# Patient Record
Sex: MALE | Race: WHITE | Employment: FULL TIME | URBAN - METROPOLITAN AREA
[De-identification: names, ages, dates, MRNs, and addresses within clinical notes are randomized per-mention and may not be internally consistent; named-entity substitution may affect disease eponyms.]

---

## 2023-10-10 ENCOUNTER — OFFICE VISIT (OUTPATIENT)
Dept: URGENT CARE | Age: 58
End: 2023-10-10

## 2023-10-10 VITALS
TEMPERATURE: 98.3 F | OXYGEN SATURATION: 98 % | WEIGHT: 165 LBS | BODY MASS INDEX: 23.62 KG/M2 | DIASTOLIC BLOOD PRESSURE: 101 MMHG | RESPIRATION RATE: 14 BRPM | SYSTOLIC BLOOD PRESSURE: 160 MMHG | HEART RATE: 84 BPM | HEIGHT: 70 IN

## 2023-10-10 DIAGNOSIS — I10 ESSENTIAL HYPERTENSION: ICD-10-CM

## 2023-10-10 DIAGNOSIS — J45.909 ASTHMA DUE TO SEASONAL ALLERGIES: Primary | ICD-10-CM

## 2023-10-10 RX ORDER — AMLODIPINE BESYLATE 5 MG/1
5 TABLET ORAL DAILY
Qty: 30 TABLET | Refills: 0 | Status: SHIPPED | OUTPATIENT
Start: 2023-10-10

## 2023-10-10 RX ORDER — ALBUTEROL SULFATE 90 UG/1
2 AEROSOL, METERED RESPIRATORY (INHALATION) 4 TIMES DAILY PRN
Qty: 18 G | Refills: 0 | Status: SHIPPED | OUTPATIENT
Start: 2023-10-10

## 2023-10-10 RX ORDER — MONTELUKAST SODIUM 10 MG/1
10 TABLET ORAL DAILY
Qty: 30 TABLET | Refills: 0 | Status: SHIPPED | OUTPATIENT
Start: 2023-10-10

## 2023-10-10 RX ORDER — MONTELUKAST SODIUM 10 MG/1
10 TABLET ORAL DAILY
Qty: 30 TABLET | Refills: 3 | Status: SHIPPED | OUTPATIENT
Start: 2023-10-10 | End: 2023-10-10

## 2023-10-10 RX ORDER — ALBUTEROL SULFATE 90 UG/1
2 AEROSOL, METERED RESPIRATORY (INHALATION) EVERY 6 HOURS PRN
COMMUNITY

## 2023-10-10 ASSESSMENT — ENCOUNTER SYMPTOMS
CHEST TIGHTNESS: 1
SINUS PRESSURE: 0
EYE ITCHING: 0
EYE DISCHARGE: 0
WHEEZING: 1
COUGH: 1
SORE THROAT: 0

## 2023-10-10 NOTE — PROGRESS NOTES
Carissadank Rosa (:  1965) is a 59 y.o. male,New patient, here for evaluation of the following chief complaint(s):  Cough and Congestion (Seasonal Allergies, happened same time last year )      ASSESSMENT/PLAN:  1. Asthma due to seasonal allergies    Albuterol inhaler refilled  Start Singulair daily for seasonal allergies  Continue Allegra and Flonase OTC    - [facility], [facility]  - montelukast (SINGULAIR) 10 MG tablet; Take 1 tablet by mouth daily  Dispense: 30 tablet; Refill: 0    2. Essential hypertension    Patient states blood pressure was under control and lisinopril was stopped by another provider  Patient hypertensive today. Is requesting different medication from lisinopril. States he didn't think it worked well and didn't like how it made him feel  Start Amlodipine daily for blood pressure  Follow up in two weeks for BP check  Encouraged patient to monitor blood pressure and follow up with PCP. DASH Diet and Lifestyle changes discussed     - amLODIPine (NORVASC) 5 MG tablet; Take 1 tablet by mouth daily  Dispense: 30 tablet; [facility]       Return in about 2 weeks (around 10/24/2023) for Blood pressure check. SUBJECTIVE/OBJECTIVE:  Patient comes in today for wheezing, congestion and PND. Patient has hx of seasonal allergies, has been taking flonase and saline nasal rinses at home. Has taken Allegra OTC daily in the past and helped with symptoms. States wheezing is worse at night. Patient has seen another urgent care and given Azithromycin, prednisone, albuterol and cough syrups, did not take consistently or as directed. Albuterol inhaler does help with wheezing. Patient does not have PCP. History provided by:  Patient   used: No    Cough  Associated symptoms include postnasal drip and wheezing. Pertinent negatives include no fever or sore throat.  His past

## 2023-10-10 NOTE — PATIENT INSTRUCTIONS
Start Singulair daily for seasonal allergies  Continue Allegra and Flonase  Start Amlodipine daily for high blood pressure  Follow up with PCP  Your blood pressure was elevated today be sure to take your medications as directed, monitor blood pressure at home and follow a low salt cardiac diet. Follow up with regular physician for management.

## 2023-11-05 DIAGNOSIS — I10 ESSENTIAL HYPERTENSION: ICD-10-CM

## 2023-11-05 RX ORDER — AMLODIPINE BESYLATE 5 MG/1
5 TABLET ORAL DAILY
Qty: 30 TABLET | Refills: 0 | OUTPATIENT
Start: 2023-11-05